# Patient Record
Sex: MALE | Race: OTHER | HISPANIC OR LATINO | URBAN - METROPOLITAN AREA
[De-identification: names, ages, dates, MRNs, and addresses within clinical notes are randomized per-mention and may not be internally consistent; named-entity substitution may affect disease eponyms.]

---
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
## 2025-05-14 ENCOUNTER — EMERGENCY (EMERGENCY)
Facility: HOSPITAL | Age: 20
LOS: 1 days | End: 2025-05-14
Attending: EMERGENCY MEDICINE
Payer: SELF-PAY

## 2025-05-14 VITALS
WEIGHT: 263.01 LBS | DIASTOLIC BLOOD PRESSURE: 84 MMHG | TEMPERATURE: 99 F | SYSTOLIC BLOOD PRESSURE: 134 MMHG | RESPIRATION RATE: 18 BRPM | HEART RATE: 102 BPM | OXYGEN SATURATION: 99 %

## 2025-05-14 VITALS
SYSTOLIC BLOOD PRESSURE: 145 MMHG | HEART RATE: 100 BPM | OXYGEN SATURATION: 99 % | RESPIRATION RATE: 19 BRPM | DIASTOLIC BLOOD PRESSURE: 96 MMHG | TEMPERATURE: 99 F

## 2025-05-14 PROCEDURE — T1013: CPT

## 2025-05-14 PROCEDURE — 99283 EMERGENCY DEPT VISIT LOW MDM: CPT

## 2025-05-14 RX ORDER — AMOXICILLIN 500 MG/1
500 CAPSULE ORAL ONCE
Refills: 0 | Status: COMPLETED | OUTPATIENT
Start: 2025-05-14 | End: 2025-05-14

## 2025-05-14 RX ORDER — CIPROFLOXACIN 6 MG/ML
1 SUSPENSION INTRATYMPANIC
Qty: 1 | Refills: 0
Start: 2025-05-14

## 2025-05-14 RX ORDER — AMOXICILLIN 500 MG/1
1 CAPSULE ORAL
Qty: 20 | Refills: 0
Start: 2025-05-14 | End: 2025-05-23

## 2025-05-14 RX ADMIN — Medication 1 DROP(S): at 14:58

## 2025-05-14 RX ADMIN — AMOXICILLIN 500 MILLIGRAM(S): 500 CAPSULE ORAL at 14:58

## 2025-05-14 NOTE — ED PROVIDER NOTE - PROGRESS NOTE DETAILS
patient treated with amoxicillin and ciprofloxacin otic in the ED and DC home in stable condition with Rx sent to patient pharmacy.  Patient will continue with ibuprofen OTC for pain control as discussed

## 2025-05-14 NOTE — ED PROVIDER NOTE - CARE PLAN
1 Principal Discharge DX:	Otitis media  Goal:	pain reduction and appropriate management  Assessment and plan of treatment:	total-year-old male presents to ED with bilateral ear pain x 5 days.  Examination of the left ear and ear canal positive for discoloration ear canal on the left TM intact with no bulging.  Right ear canal within normal limits positive bulging noted to left ear canal and no light reflex.  .  Patient will be treated with amoxicillin and ciprofloxacin otic and patient will follow-up with ENT physician as discussed  Secondary Diagnosis:	Otitis externa

## 2025-05-14 NOTE — ED PROVIDER NOTE - CARE PROVIDER_API CALL
Reese Palma  Otolaryngology  [address] [place] [address], [address]  [place] [address]  Phone: [phone]  Fax: [phone]  Follow Up Time: 4-6 Days

## 2025-05-14 NOTE — ED PROVIDER NOTE - NSFOLLOWUPINSTRUCTIONS_ED_ALL_ED_FT
continue medication amoxicillin 1 pill twice a day x 10 days  Ciprofloxacin optic 1 drop in each ear twice a day.  Please follow-up with ENT physician as discussed

## 2025-05-14 NOTE — ED PROVIDER NOTE - PATIENT PORTAL LINK FT
You can access the FollowMyHealth Patient Portal offered by [facility] by registering at the following website: [email or web address]. By joining [employer]’s FollowMyHealth portal, you will also be able to view your health information using other applications (apps) compatible with our system.

## 2025-05-14 NOTE — ED PROVIDER NOTE - CLINICAL SUMMARY MEDICAL DECISION MAKING FREE TEXT BOX
20-year-old male presents to ED complaining of right-sided ear pain x 5 days and left ear pain x 2 days.  Patient denies any fever, chills, short of or difficulty breathing.  Patient denies any dizziness but admits to decreased sound in his left ear.     Examination positive for right ear otitis externa with blood in the ear canal.  Normal tympanic membrane.   left tympanic membrane bulging with no light reflex ear canal within normal limits.  Patient did not examination negative for milner signs or raccoon eyes.    Patient will be treated with amoxicillin and ciprofloxacin otic drop in the ED and DC in stable condition.  Patient follow-up with  ENT as discussed

## 2025-05-14 NOTE — ED ADULT NURSE NOTE - OBJECTIVE STATEMENT
patient comes into ED c/o left ear pain since 3am this morning. Patient states the pain woke him up from his sleep and he cannot hear from his left ear. Patients states he had right sided ear pain 5 days ago that went away and now it is his left hear now. patient denies any cough, or congestion. respirations even and unlabored. pt shows no s/s of acute distress at this time. safety precautions maintained.

## 2025-05-14 NOTE — ED PROVIDER NOTE - OBJECTIVE STATEMENT
20-year-old male presents to ED complaining of right-sided ear pain x 5 days and left ear pain x 2 days.  Patient denies any fever, chills, short of or difficulty breathing.  Patient denies any dizziness but admits to decreased sound in his left ear.  Patient also admits to his feeling itchy sensation in his right ear and scratches a lot with a Q-tip.  Patient denies any fever, chills, shortness of breath or difficulty breathing.  Patient denies any other issue at this time.  Patient denies any segment past medical surgical illness.  Patient denies any allergies to medication or current medications.

## 2025-05-14 NOTE — ED PROVIDER NOTE - PLAN OF CARE
pain reduction and appropriate management total-year-old male presents to ED with bilateral ear pain x 5 days.  Examination of the left ear and ear canal positive for discoloration ear canal on the left TM intact with no bulging.  Right ear canal within normal limits positive bulging noted to left ear canal and no light reflex.  .  Patient will be treated with amoxicillin and ciprofloxacin otic and patient will follow-up with ENT physician as discussed

## 2025-06-04 ENCOUNTER — NON-APPOINTMENT (OUTPATIENT)
Age: 20
End: 2025-06-04